# Patient Record
Sex: MALE | Race: WHITE | Employment: UNEMPLOYED | URBAN - METROPOLITAN AREA
[De-identification: names, ages, dates, MRNs, and addresses within clinical notes are randomized per-mention and may not be internally consistent; named-entity substitution may affect disease eponyms.]

---
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
## 2024-07-29 ENCOUNTER — OFFICE VISIT (OUTPATIENT)
Dept: FAMILY MEDICINE CLINIC | Age: 19
End: 2024-07-29
Payer: COMMERCIAL

## 2024-07-29 VITALS
WEIGHT: 133.6 LBS | BODY MASS INDEX: 22.26 KG/M2 | DIASTOLIC BLOOD PRESSURE: 76 MMHG | OXYGEN SATURATION: 99 % | HEIGHT: 65 IN | HEART RATE: 97 BPM | RESPIRATION RATE: 16 BRPM | SYSTOLIC BLOOD PRESSURE: 111 MMHG | TEMPERATURE: 97.7 F

## 2024-07-29 DIAGNOSIS — F19.90 SUBSTANCE USE: ICD-10-CM

## 2024-07-29 DIAGNOSIS — M25.811 CLICKING RIGHT SHOULDER: Primary | ICD-10-CM

## 2024-07-29 PROCEDURE — 99213 OFFICE O/P EST LOW 20 MIN: CPT | Performed by: STUDENT IN AN ORGANIZED HEALTH CARE EDUCATION/TRAINING PROGRAM

## 2024-07-29 RX ORDER — MAGNESIUM GLUCONATE 27 MG(500)
500 TABLET ORAL DAILY
COMMUNITY

## 2024-07-29 RX ORDER — M-VIT,TX,IRON,MINS/CALC/FOLIC 27MG-0.4MG
1 TABLET ORAL DAILY
COMMUNITY

## 2024-07-29 SDOH — ECONOMIC STABILITY: HOUSING INSECURITY
IN THE LAST 12 MONTHS, WAS THERE A TIME WHEN YOU DID NOT HAVE A STEADY PLACE TO SLEEP OR SLEPT IN A SHELTER (INCLUDING NOW)?: NO

## 2024-07-29 SDOH — ECONOMIC STABILITY: FOOD INSECURITY: WITHIN THE PAST 12 MONTHS, THE FOOD YOU BOUGHT JUST DIDN'T LAST AND YOU DIDN'T HAVE MONEY TO GET MORE.: NEVER TRUE

## 2024-07-29 SDOH — ECONOMIC STABILITY: INCOME INSECURITY: HOW HARD IS IT FOR YOU TO PAY FOR THE VERY BASICS LIKE FOOD, HOUSING, MEDICAL CARE, AND HEATING?: NOT HARD AT ALL

## 2024-07-29 SDOH — ECONOMIC STABILITY: FOOD INSECURITY: WITHIN THE PAST 12 MONTHS, YOU WORRIED THAT YOUR FOOD WOULD RUN OUT BEFORE YOU GOT MONEY TO BUY MORE.: NEVER TRUE

## 2024-07-29 ASSESSMENT — PATIENT HEALTH QUESTIONNAIRE - PHQ9
2. FEELING DOWN, DEPRESSED OR HOPELESS: NOT AT ALL
1. LITTLE INTEREST OR PLEASURE IN DOING THINGS: NOT AT ALL
SUM OF ALL RESPONSES TO PHQ QUESTIONS 1-9: 0
SUM OF ALL RESPONSES TO PHQ9 QUESTIONS 1 & 2: 0
SUM OF ALL RESPONSES TO PHQ QUESTIONS 1-9: 0

## 2024-07-29 NOTE — PROGRESS NOTES
[facility]  Department of Family Medicine  Family Medicine Residency Program      Patient: Kael Gonsalez 19 y.o. male     Date of Service: 7/29/24        Chief complaint:   Chief Complaint   Patient presents with    Establish Care       HISTORY OF PRESENTING ILLNESS     19 y.o. male is a new patient with no PMHx here to establish care.    Right shoulder  -Used to wrestle in high school  -Feels like it sometimes it feels like its popping  -Feels a click with rolling  -Very rarely hurts, sometimes when lifting  -No surgeries  -No impact on daily life    Health maintenance:  -Covid vaccine: No  -Flu vaccine: Yes  -Tdap: UTD  -Hep C screening: Declines  -HIV screening: Declines  -Lipid screening: Not indicated  -DM screening (A1c): Not indicated  -Depression screening: PHQ2 = 0    SUBS Screen (07/29/2024)  -Nicotine use: high risk use  -Alcohol use: high risk use  -Recreational drug use: high risk use  -Pharmaceutical drug misuse: no/low risk    Does not meet criteria for any JULIETA  -Uses Zyn pouches once every few weeks, no current or other history of smoking  -Alcohol use 3-4 drinks 1x/month, no legal trouble, no other criteria  -Medical marijuana use 2x/week, consistent with PDMP      Health Maintenance:  Health Maintenance Due   Topic Date Due    COVID-19 Vaccine (1) Never done    HPV vaccine (1 - Male 2-dose series) Never done    Hepatitis C screen  Never done           Past Medical History:  No past medical history on file.        Past Surgical History:    No past surgical history on file.        Allergies:    Patient has no known allergies.      Social History:   Social History     Socioeconomic History    Marital status: Single     Spouse name: Not on file    Number of children: Not on file    Years of education: Not on file    Highest education level: Not on file   Occupational History    Not on file   Tobacco Use    Smoking status: Never    Smokeless tobacco: Current   Substance and

## 2024-07-29 NOTE — PROGRESS NOTES
This patient was screened with SUBS screening tool.   On 7/29/2024 the patient has a Positive Screen and the result can be found scanned into the chart

## 2024-07-29 NOTE — PROGRESS NOTES
S: 19 y.o. male with   Chief Complaint   Patient presents with    Establish Care       Establish care - no prior medical history - has clicking of R shoulder, no pains, no prior dislocation.        O: VS:  height is 1.651 m (5' 5\") and weight is 60.6 kg (133 lb 9.6 oz). His temporal temperature is 97.7 °F (36.5 °C). His blood pressure is 111/76 and his pulse is 97. His respiration is 16 and oxygen saturation is 99%.   BP Readings from Last 3 Encounters:   07/29/24 111/76   10/24/23 106/68   03/29/23 113/79     See resident note      Impression/Plan:   1) R shoulder clicks - contact us if bothersome otherwise observe.   2) substance use-  discussed decreasing usage of subs.         Health Maintenance Due   Topic Date Due    COVID-19 Vaccine (1) Never done    HPV vaccine (1 - Male 2-dose series) Never done    Depression Screen  Never done    Hepatitis C screen  Never done         Attending Physician Statement  I have discussed the case, including pertinent history and exam findings with the resident.  I agree with the documented assessment and plan.      Vincent Deleon MD

## 2024-07-30 ASSESSMENT — ENCOUNTER SYMPTOMS
VOMITING: 0
CONSTIPATION: 0
SHORTNESS OF BREATH: 0
RHINORRHEA: 0
NAUSEA: 0
DIARRHEA: 0
ABDOMINAL PAIN: 0
COUGH: 0